# Patient Record
Sex: MALE | Race: WHITE | URBAN - METROPOLITAN AREA
[De-identification: names, ages, dates, MRNs, and addresses within clinical notes are randomized per-mention and may not be internally consistent; named-entity substitution may affect disease eponyms.]

---

## 2017-02-22 ENCOUNTER — OFFICE VISIT (OUTPATIENT)
Dept: FAMILY MEDICINE | Facility: CLINIC | Age: 27
End: 2017-02-22
Payer: OTHER MISCELLANEOUS

## 2017-02-22 VITALS
HEIGHT: 69 IN | WEIGHT: 148.6 LBS | BODY MASS INDEX: 22.01 KG/M2 | TEMPERATURE: 99.5 F | DIASTOLIC BLOOD PRESSURE: 64 MMHG | HEART RATE: 100 BPM | SYSTOLIC BLOOD PRESSURE: 136 MMHG

## 2017-02-22 DIAGNOSIS — M77.8 TENDINITIS OF RIGHT WRIST: Primary | ICD-10-CM

## 2017-02-22 PROCEDURE — 99203 OFFICE O/P NEW LOW 30 MIN: CPT | Performed by: NURSE PRACTITIONER

## 2017-02-22 RX ORDER — NAPROXEN SODIUM 550 MG/1
TABLET ORAL
Qty: 60 TABLET | Refills: 0 | Status: SHIPPED | OUTPATIENT
Start: 2017-02-22

## 2017-02-22 ASSESSMENT — ENCOUNTER SYMPTOMS
NAUSEA: 0
FATIGUE: 0
SORE THROAT: 0
SINUS PRESSURE: 0
COUGH: 0
WEAKNESS: 1
SHORTNESS OF BREATH: 0
EYE DISCHARGE: 0
VOMITING: 0
NUMBNESS: 0
WHEEZING: 0
FEVER: 0
RHINORRHEA: 0
ARTHRALGIAS: 1
HEADACHES: 0
DIARRHEA: 0
DIAPHORESIS: 0

## 2017-02-22 NOTE — PATIENT INSTRUCTIONS
These are general instructions and may not be specific to you. Please call, email or follow up if you have any questions or concerns.     Tendonitis  A tendon is the thick fibrous cord that joins muscle to bone and allows joints to move. When a tendon becomes inflamed, it is called tendonitis. This can occur from overuse, injury, or infection. This usually involves the shoulders, forearm, wrist, hands and foot. Symptoms include pain, swelling and tenderness to the touch. Moving the joint increases the pain.  It takes 4 to 6 weeks for tendonitis to heal. It is treated by preventing motion of the tendon with a splint or brace and the use of anti-inflammatory medicine.  Home care    Some people find relief with ice packs. These can be crushed or cubed ice in a plastic bag or a bag of frozen vegetables wrapped in a thin towel. Other people get better relief with heat. This can include a hot shower, hot bath, or a moist towel warmed in a microwave. Try each and use the method that feels best, for 15 to 20 minutes several times a day.    Rest the inflamed joint and protect it from movement.    You may use over-the-counter ibuprofen or naproxen to treat pain and inflammation, unless another medicine was prescribed. If you can't take these medicines, acetaminophen may help with the pain, but does not treat inflammation. If you have chronic liver or kidney disease or ever had a stomach ulcer or gastrointestinal bleeding, talk with your doctor before using these medicines.    As your symptoms improve, begin gradual motion at the involved joint.  Follow-up care  Follow up with your healthcare provider if you are not improving after 5 days of treatment.  When to seek medical advice  Call your healthcare provider right away if any of these occur:    Redness over the painful area    Increasing pain or swelling at the joint    Fever (1 degree above your normal temperature) lasting 24 to 48 hours Or, whatever your healthcare  provider told you to report based on your condition    5123-7573 [employer]. [address] [place] [address], [place] [address]. All rights reserved. This information is not intended as a substitute for professional medical care. Always follow your healthcare professional's instructions.

## 2017-02-22 NOTE — LETTER
[facility]  [address] [address]  [phone]  Dept: [phone]      2/22/2017    Re: Robinson Penny      TO WHOM IT MAY CONCERN:    Robinson Penny  was seen on 2/22/17. He should not operate gang saw for the next week until he is fully recovered from his injury.     Cordially    KRUNAL Ceballos CNP  [facility]

## 2017-02-22 NOTE — NURSING NOTE
"Chief Complaint   Patient presents with     Work Comp       Initial /64 (BP Location: Left arm, Patient Position: Chair, Cuff Size: Adult Regular)  Pulse 100  Temp 99.5  F (37.5  C) (Tympanic)  Ht 5' 8.5\" (1.74 m)  Wt 148 lb 9.6 oz (67.4 kg)  BMI 22.27 kg/m2 Estimated body mass index is 22.27 kg/(m^2) as calculated from the following:    Height as of this encounter: 5' 8.5\" (1.74 m).    Weight as of this encounter: 148 lb 9.6 oz (67.4 kg).  Medication Reconciliation: complete     April NASIM Thompson      "

## 2017-02-22 NOTE — MR AVS SNAPSHOT
After Visit Summary   2/22/2017    Robinson Penny    MRN: [id number]           Patient Information     Date Of Birth          1990        Visit Information        Provider Department      2/22/2017 1:20 PM Jessika Ortega APRN [facility]        Today's Diagnoses     Tendinitis of right wrist    -  1      Care Instructions    These are general instructions and may not be specific to you. Please call, email or follow up if you have any questions or concerns.     Tendonitis  A tendon is the thick fibrous cord that joins muscle to bone and allows joints to move. When a tendon becomes inflamed, it is called tendonitis. This can occur from overuse, injury, or infection. This usually involves the shoulders, forearm, wrist, hands and foot. Symptoms include pain, swelling and tenderness to the touch. Moving the joint increases the pain.  It takes 4 to 6 weeks for tendonitis to heal. It is treated by preventing motion of the tendon with a splint or brace and the use of anti-inflammatory medicine.  Home care    Some people find relief with ice packs. These can be crushed or cubed ice in a plastic bag or a bag of frozen vegetables wrapped in a thin towel. Other people get better relief with heat. This can include a hot shower, hot bath, or a moist towel warmed in a microwave. Try each and use the method that feels best, for 15 to 20 minutes several times a day.    Rest the inflamed joint and protect it from movement.    You may use over-the-counter ibuprofen or naproxen to treat pain and inflammation, unless another medicine was prescribed. If you can't take these medicines, acetaminophen may help with the pain, but does not treat inflammation. If you have chronic liver or kidney disease or ever had a stomach ulcer or gastrointestinal bleeding, talk with your doctor before using these medicines.    As your symptoms improve, begin gradual motion at the involved joint.  Follow-up  care  Follow up with your healthcare provider if you are not improving after 5 days of treatment.  When to seek medical advice  Call your healthcare provider right away if any of these occur:    Redness over the painful area    Increasing pain or swelling at the joint    Fever (1 degree above your normal temperature) lasting 24 to 48 hours Or, whatever your healthcare provider told you to report based on your condition    4010-4289 [employer]. [address] [place] [address], [place] [address]. All rights reserved. This information is not intended as a substitute for professional medical care. Always follow your healthcare professional's instructions.              Follow-ups after your visit        Additional Services     DME REFERRAL       Please be aware that coverage of these services is subject to the terms and limitations of your health insurance plan.  Call member services at your health plan with any benefit or coverage questions.      Please bring the following to your appointment:  Any x-rays, CTs or MRIs which have been performed.  Contact the facility where they were done to arrange for  prior to your scheduled appointment.    List of current medications   This referral request   Any documents/labs given to you for this referral      Right wrist brace                  Who to contact     Normal or non-critical lab and imaging results will be communicated to you by MyChart, letter or phone within 4 business days after the clinic has received the results. If you do not hear from us within 7 days, please contact the clinic through MyChart or phone. If you have a critical or abnormal lab result, we will notify you by phone as soon as possible.  Submit refill requests through [employer] or call your pharmacy and they will forward the refill request to us. Please allow 3 business days for your refill to be completed.          If you need to speak with a  for additional information ,  "please call: [phone]           Additional Information About Your Visit        MyChart Information     [employer] lets you send messages to your doctor, view your test results, renew your prescriptions, schedule appointments and more. To sign up, go to [email or web address] . Click on \"Log in\" on the left side of the screen, which will take you to the Welcome page. Then click on \"Sign up Now\" on the right side of the page.     You will be asked to enter the access code listed below, as well as some personal information. Please follow the directions to create your username and password.     Your access code is: [email or web address]  Expires: 2017  1:44 PM     Your access code will  in 90 days. If you need help or a new code, please call your [facility] clinic or [phone].        Care EveryWhere ID     This is your Care EveryWhere ID. This could be used by other organizations to access your [facility] medical records  [id number]        Your Vitals Were     Pulse Temperature Height BMI (Body Mass Index)          100 99.5  F (37.5  C) (Tympanic) 5' 8.5\" (1.74 m) 22.27 kg/m2         Blood Pressure from Last 3 Encounters:   17 136/64    Weight from Last 3 Encounters:   17 148 lb 9.6 oz (67.4 kg)   16 157 lb 3.2 oz (71.3 kg)              We Performed the Following     DME REFERRAL          Today's Medication Changes          These changes are accurate as of: 17  1:44 PM.  If you have any questions, ask your nurse or doctor.               Start taking these medicines.        Dose/Directions    naproxen sodium 550 MG tablet   Commonly known as:  ANAPROX   Used for:  Tendinitis of right wrist   Started by:  Jessika Ortega APRN CNP        Take twice per day with food for the next week. Then may take as needed   Quantity:  60 tablet   Refills:  0            Where to get your medicines      These medications were sent to [facility] [address] [facility] [address]  " [address] [address]     Phone:  [phone]     naproxen sodium 550 MG tablet                Primary Care Provider    None       No address on file        Thank you!     Thank you for choosing [facility]  for your care. Our goal is always to provide you with excellent care. Hearing back from our patients is one way we can continue to improve our services. Please take a few minutes to complete the written survey that you may receive in the mail after your visit with us. Thank you!             Your Updated Medication List - Protect others around you: Learn how to safely use, store and throw away your medicines at www.disposemymeds.org.          This list is accurate as of: 2/22/17  1:44 PM.  Always use your most recent med list.                   Brand Name Dispense Instructions for use    ADVIL PO          naproxen sodium 550 MG tablet    ANAPROX    60 tablet    Take twice per day with food for the next week. Then may take as needed

## 2017-02-22 NOTE — PROGRESS NOTES
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 Saw tre not been working correctly and is having to hold and turn board and direct the board against metal guide bar. Has been having to do this for some time. Friday had to do this all day long and was having to use more force on Friday to direct the boards. Right wrist was sore after work on Friday. Saturday and Sunday did have some discomfort. Monday was having a lot of pain and had to call into work because of the pain. Did go to work yesterday and did a different job yesterday and then today. Took some Advil at home and that really did not help. Is right handed. No previous injury to this wrist. No numbness or tingling to fingers. Hurts to move wrist around. Pain will wake in the middle of night. Has been doing this job since Aug of 2016. Gets shooting sharp pain.     Musculoskeletal problem/pain      Duration: 2/17/17    Description  Location: right wrist    Intensity:  Severe when he bends his wrist back    Accompanying signs and symptoms: weakness of right wrist    History  Previous similar problem: no   Previous evaluation:  none    Precipitating or alleviating factors:  Trauma or overuse: YES- feeding boards into a gang saw at work, the saw wasn't running as efficiently as it should be, he had to continuously apply extra pressure to the boards with his right hand to keep them straight.    Aggravating factors include: overuse and bending wrist back    Therapies tried and outcome: Advil not effective, on the first day he used ice and hot water-this helped        Problem list and histories reviewed & adjusted, as indicated.  Additional history: as documented    Current Outpatient Prescriptions   Medication Sig Dispense Refill     Ibuprofen (ADVIL PO)        naproxen sodium (ANAPROX) 550 MG tablet Take  "twice per day with food for the next week. Then may take as needed 60 tablet 0     Allergies   Allergen Reactions     No Known Drug Allergies        ROS:  Review of Systems   Constitutional: Negative for diaphoresis, fatigue and fever.   HENT: Negative for congestion, ear pain, rhinorrhea, sinus pressure and sore throat.    Eyes: Negative for discharge.   Respiratory: Negative for cough, shortness of breath and wheezing.    Cardiovascular: Negative for chest pain.   Gastrointestinal: Negative for diarrhea, nausea and vomiting.   Musculoskeletal: Positive for arthralgias (right wrist pain).   Neurological: Positive for weakness (right hand). Negative for numbness and headaches.         OBJECTIVE:                                                    /64 (BP Location: Left arm, Patient Position: Chair, Cuff Size: Adult Regular)  Pulse 100  Temp 99.5  F (37.5  C) (Tympanic)  Ht 5' 8.5\" (1.74 m)  Wt 148 lb 9.6 oz (67.4 kg)  BMI 22.27 kg/m2  Body mass index is 22.27 kg/(m^2).  Physical Exam   Constitutional: He appears well-developed and well-nourished.   Cardiovascular: Normal rate, regular rhythm and normal heart sounds.    Pulmonary/Chest: Effort normal and breath sounds normal.   Musculoskeletal:        Right wrist: He exhibits decreased range of motion and tenderness. He exhibits no bony tenderness, no swelling, no effusion, no crepitus and no deformity.   Neurological: He is alert.   Skin: Skin is warm and dry.            ASSESSMENT/PLAN:                                                    1. Tendinitis of right wrist  Educated regarding use of med, no additional over the counter aleve, ibuprofen or advil  Prescription for wrist brace  Continue to rest wrist  Should not operate gang saw for the next week until pain is resolved  Informed that when returns should operate saw and alternate duties during the day to avoid overuse   - naproxen sodium (ANAPROX) 550 MG tablet; Take twice per day with food for the next " week. Then may take as needed  Dispense: 60 tablet; Refill: 0  - DME REFERRAL        KRUNAL Ceballos [facility]